# Patient Record
Sex: FEMALE | Race: OTHER | NOT HISPANIC OR LATINO
[De-identification: names, ages, dates, MRNs, and addresses within clinical notes are randomized per-mention and may not be internally consistent; named-entity substitution may affect disease eponyms.]

---
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
## 2020-01-20 ENCOUNTER — APPOINTMENT (OUTPATIENT)
Dept: DERMATOLOGY | Facility: CLINIC | Age: 20
End: 2020-01-20

## 2021-05-13 ENCOUNTER — APPOINTMENT (OUTPATIENT)
Dept: OBGYN | Facility: CLINIC | Age: 21
End: 2021-05-13
Payer: COMMERCIAL

## 2021-05-13 VITALS
SYSTOLIC BLOOD PRESSURE: 110 MMHG | WEIGHT: 119 LBS | BODY MASS INDEX: 21.9 KG/M2 | HEIGHT: 62 IN | DIASTOLIC BLOOD PRESSURE: 69 MMHG

## 2021-05-13 PROCEDURE — 99072 ADDL SUPL MATRL&STAF TM PHE: CPT

## 2021-05-13 PROCEDURE — 99395 PREV VISIT EST AGE 18-39: CPT

## 2021-05-13 NOTE — PLAN
[FreeTextEntry1] : 20 year old female wwe\par \par 1. Pelvic exam deferred - not sexually active\par 2. SBE reviewed\par 3. Counseled on irregular menses, heavy menses and dysmenorrhea.  Differential diagnosis discussed with the patient.  Will get day 3 labs and RTO for tv sono and to review blood work.

## 2021-05-13 NOTE — HISTORY OF PRESENT ILLNESS
[FreeTextEntry1] : 20 year old female presents for wwe.  She has no complaints today.  She states she recently saw her PCP and her cholesterol was elevated.Her PCP told her that she should see her gyn for a PCOS work up.  Menarche was at age 10.  Menses are q 28-30 days, but she does have a history of irregular cycles.  She has never skipped a cycle.  She does have heavy menses and cramping.  She has a history of acne but denies hirsutism.  She thought it the past that she could have endometriosis.  She has never been sexually active.  She was on Mayra but stopped 6 weeks ago as it was too expensive.  She is unsure if she wants to go back on OCP's.  She has been on femcon, OTC, mircette and deporpovera in the past.  She has a medical history of anxiety, depression, and a blood transfusion.  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  She takes spironolactone, celexa and trazadone as needed.

## 2021-06-23 ENCOUNTER — APPOINTMENT (OUTPATIENT)
Dept: OBGYN | Facility: CLINIC | Age: 21
End: 2021-06-23
Payer: COMMERCIAL

## 2021-06-23 ENCOUNTER — ASOB RESULT (OUTPATIENT)
Age: 21
End: 2021-06-23

## 2021-06-23 VITALS — WEIGHT: 119 LBS | BODY MASS INDEX: 21.9 KG/M2 | HEIGHT: 62 IN

## 2021-06-23 PROCEDURE — 99213 OFFICE O/P EST LOW 20 MIN: CPT

## 2021-06-23 PROCEDURE — 99072 ADDL SUPL MATRL&STAF TM PHE: CPT

## 2021-06-23 PROCEDURE — 76856 US EXAM PELVIC COMPLETE: CPT

## 2021-09-02 NOTE — PLAN
[FreeTextEntry1] : 20 year old female with history of irregular menses, heavy menses and dysmenorrhea.  She did not go for the day 3 blood work but plans to have it done.  Sono results reviewed with the patient and polyfollicular ovaries noted.  We discussed PCOS and the diagnostic criteria of irregular menses and acne or hirsutism.  she has a history of acne.  We discussed that she may have a mild case of PCOS.  she is not interested in being on OCP's to regulate her cycle as she is not sexually active.  We discussed that to protect the lining of her uterus she should have her cycle at least every 3 months.  She states she will call if she goes more than 3 months without her menses and she understands the importance of follow up.  The patient was given the opportunity to ask questions and all were answered to her satisfaction.  Will call with results of day 3 labs.  RTO in 1 year for wwe.

## 2021-09-02 NOTE — HISTORY OF PRESENT ILLNESS
[FreeTextEntry1] : 20 year old female presents for sono results.  She did not have the day 3 labs done.  She has no complaints today.  She states she recently saw her PCP and her cholesterol was elevated.  Her PCP told her that she should see her gyn for a PCOS work up.  Menarche was at age 10.  Menses are q 28-30 days, but she does have a history of irregular cycles.  She has never skipped a cycle.  She does have heavy menses and cramping.  She has a history of acne but denies hirsutism.  She thought in the past that she could have endometriosis.  She has never been sexually active.  She was on Mayra but stopped 2 months ago as it was too expensive.  She is unsure if she wants to go back on OCP's.  She has been on femcon, OTC, mircette and deporpovera in the past.  She has a medical history of anxiety, depression, and a blood transfusion.  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  She takes spironolactone, celexa and trazadone as needed.

## 2022-05-31 ENCOUNTER — APPOINTMENT (OUTPATIENT)
Dept: OBGYN | Facility: CLINIC | Age: 22
End: 2022-05-31

## 2022-05-31 ENCOUNTER — TRANSCRIPTION ENCOUNTER (OUTPATIENT)
Age: 22
End: 2022-05-31

## 2022-05-31 VITALS
BODY MASS INDEX: 20.98 KG/M2 | HEIGHT: 62 IN | SYSTOLIC BLOOD PRESSURE: 112 MMHG | WEIGHT: 114 LBS | DIASTOLIC BLOOD PRESSURE: 70 MMHG

## 2022-05-31 PROCEDURE — 99395 PREV VISIT EST AGE 18-39: CPT

## 2022-06-01 LAB
C TRACH RRNA SPEC QL NAA+PROBE: NOT DETECTED
N GONORRHOEA RRNA SPEC QL NAA+PROBE: NOT DETECTED
SOURCE TP AMPLIFICATION: NORMAL

## 2022-06-07 LAB — CYTOLOGY CVX/VAG DOC THIN PREP: NORMAL

## 2022-06-22 ENCOUNTER — NON-APPOINTMENT (OUTPATIENT)
Age: 22
End: 2022-06-22

## 2022-08-02 NOTE — HISTORY OF PRESENT ILLNESS
[FreeTextEntry1] : 21 year old female presents for wwe.  She has no complaints today.  Menses are q 22-38 days, but she does have a history of irregular cycles.  She has never skipped a cycle.  She does have heavy menses and cramping.  She has a history of acne but denies hirsutism.  She has a history of PCOS.  She thinks she could have endometriosis.  She has been sexually active in the past but is not currently sexually active. She is unsure if she wants to go back on OCP's.  She has been on elroy, femcon, OTC, mircette and deporpovera in the past.  She is requesting STD testing today.  She has no known exposures.\par \par She has a medical history of anxiety, depression, acne, eczema, PTSD, insomnia, orthostatic hypotension and a blood transfusion.  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  She has no significant family history she takes spironolactone, celexa and trazadone as needed.

## 2022-08-02 NOTE — PLAN
[FreeTextEntry1] : 21 year old female wwe\par \par 1.  Pap done with GC cultures\par 2. SBE reviewed\par 3.  Rx provided for STI blood work.  All risks and benefits of STI blood work reviewed with the patient.  We discussed that she should repeat this blood work in 3 to 6 months.\par 4. Counseled on irregular menses, heavy menses and dysmenorrhea.  She also has a history of acne.  She is currently taking spironolactone for her acne which has been prescribed by her dermatologist.  She also has been diagnosed with PCOS.  She is interested in starting something to help to regulate her cycles.  She has been on Femcon Fe, Ortho Tri-Cyclen, Mircette, Depo-Provera, and Mayra in the past.  She was happiest on BeYaz but states it became too expensive.  Rx provided today for Vestura x3 months.  We discussed that there can be an interaction with the Vestura and spironolactone in terms of her potassium.  She will return to the office in 3 months for an OCP, blood pressure check and a prescription to check her potassium levels.  The patient was given the opportunity to ask questions and all were answered to her satisfaction.\par 5.  Annual exam in 1 year

## 2023-06-13 ENCOUNTER — APPOINTMENT (OUTPATIENT)
Dept: OBGYN | Facility: CLINIC | Age: 23
End: 2023-06-13
Payer: COMMERCIAL

## 2023-06-13 ENCOUNTER — NON-APPOINTMENT (OUTPATIENT)
Age: 23
End: 2023-06-13

## 2023-06-13 VITALS
HEIGHT: 62 IN | DIASTOLIC BLOOD PRESSURE: 68 MMHG | BODY MASS INDEX: 22.26 KG/M2 | SYSTOLIC BLOOD PRESSURE: 104 MMHG | WEIGHT: 121 LBS

## 2023-06-13 DIAGNOSIS — Z01.419 ENCOUNTER FOR GYNECOLOGICAL EXAMINATION (GENERAL) (ROUTINE) W/OUT ABNORMAL FINDINGS: ICD-10-CM

## 2023-06-13 DIAGNOSIS — N94.6 DYSMENORRHEA, UNSPECIFIED: ICD-10-CM

## 2023-06-13 DIAGNOSIS — N92.6 IRREGULAR MENSTRUATION, UNSPECIFIED: ICD-10-CM

## 2023-06-13 PROCEDURE — 99395 PREV VISIT EST AGE 18-39: CPT

## 2023-06-13 PROCEDURE — 99212 OFFICE O/P EST SF 10 MIN: CPT | Mod: 25

## 2023-06-15 RX ORDER — DROSPIRENONE AND ETHINYL ESTRADIOL 0.02-3(28)
3-0.02 KIT ORAL DAILY
Qty: 3 | Refills: 1 | Status: COMPLETED | COMMUNITY
Start: 2018-12-15 | End: 2023-06-15

## 2023-07-05 PROBLEM — Z01.419 WELL WOMAN EXAM WITH ROUTINE GYNECOLOGICAL EXAM: Status: ACTIVE | Noted: 2021-05-13

## 2023-07-05 PROBLEM — N94.6 DYSMENORRHEA: Status: ACTIVE | Noted: 2018-12-15

## 2023-07-05 RX ORDER — DROSPIRENONE AND ETHINYL ESTRADIOL 0.02-3(28)
3-0.02 KIT ORAL
Qty: 3 | Refills: 0 | Status: ACTIVE | COMMUNITY
Start: 2022-06-15 | End: 1900-01-01

## 2023-07-05 NOTE — HISTORY OF PRESENT ILLNESS
[FreeTextEntry1] : 22 year old female presents for wwe.  She has no complaints today.  Menses are q 20-30 days, but she does have a history of irregular cycles.  She has never skipped a cycle.  She denies heavy menses.  She does get significant cramping with her menses and ovulation.  She takes Midol with some relief.  She is currently using condoms for contraception.  She has a history of acne but denies hirsutism.  She has a history of PCOS.  She thinks she could have endometriosis.  She has been sexually active in the past but is not currently sexually active. She is interested in going back on OCP's.  She has been on elroy, femcon, OTC, mircette and deporpovera in the past.  \par \par She has a medical history of anxiety, depression, acne, eczema, PTSD, insomnia, orthostatic hypotension and a blood transfusion.  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  She has no significant family history she takes spironolactone, celexa, and vitamins.

## 2023-07-05 NOTE — PLAN
[FreeTextEntry1] : 22 year old female wwe\par \par 1. Pap done with GC cultures\par 2. SBE reviewed\par 3. Counseled on irregular menses and dysmenorrhea.  She also has a history of acne.  She is currently taking spironolactone for her acne which has been prescribed by her dermatologist.  She also has been diagnosed with PCOS.  She is interested in starting something to help to regulate her cycles.  She has been on Femcon Fe, Ortho Tri-Cyclen, Mircette, Depo-Provera, and Mayra in the past.  She was happiest on BeYaz but states it became too expensive.  Rx provided today for Thom x3 months.  We discussed that there can be an interaction with the Vestura and spironolactone in terms of her potassium.  She will return to the office in 3 months for an OCP check.  She does get her potassium level checked by her dermatologist every 3 months.  The patient was given the opportunity to ask questions and all were answered to her satisfaction.\par 4.  Annual exam in 1 year

## 2023-07-06 ENCOUNTER — NON-APPOINTMENT (OUTPATIENT)
Age: 23
End: 2023-07-06

## 2023-08-14 ENCOUNTER — TRANSCRIPTION ENCOUNTER (OUTPATIENT)
Age: 23
End: 2023-08-14

## 2024-09-18 ENCOUNTER — APPOINTMENT (OUTPATIENT)
Dept: OBGYN | Facility: CLINIC | Age: 24
End: 2024-09-18
Payer: COMMERCIAL

## 2024-09-18 VITALS
BODY MASS INDEX: 25.03 KG/M2 | HEIGHT: 62 IN | DIASTOLIC BLOOD PRESSURE: 71 MMHG | SYSTOLIC BLOOD PRESSURE: 108 MMHG | WEIGHT: 136 LBS

## 2024-09-18 DIAGNOSIS — Z01.419 ENCOUNTER FOR GYNECOLOGICAL EXAMINATION (GENERAL) (ROUTINE) W/OUT ABNORMAL FINDINGS: ICD-10-CM

## 2024-09-18 PROCEDURE — 99459 PELVIC EXAMINATION: CPT

## 2024-09-18 PROCEDURE — 99395 PREV VISIT EST AGE 18-39: CPT

## 2024-09-18 NOTE — HISTORY OF PRESENT ILLNESS
[FreeTextEntry1] : 23 year old female presents for wwe.  She has no complaints today.  Menses are q 20-30 days, but she does have a history of irregular cycles.  She denies heavy menses.  She does get significant cramping with her menses and ovulation.  She takes Midol with some relief.  She has a history of acne but denies hirsutism.  She has a history of PCOS.  She thinks she could have endometriosis.  She is using condoms for contraception.  She has been on elryo, femcon, OTC, mircette and deporpovera in the past.  She was given a prescription for Elroy last year but stopped secondary to nausea.  Patient noticed a skin tag on her left vulva.  She states that this is bothersome and would like to have it removed.  She is interested in having this looked at today.     She has a medical history of anxiety, depression, acne, eczema, PTSD, insomnia, orthostatic hypotension and a blood transfusion.  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  She has no significant family history she takes spironolactone, celexa, aripiprazole, and Valtrex.

## 2024-09-18 NOTE — PHYSICAL EXAM
[Chaperone Present] : A chaperone was present in the examining room during all aspects of the physical examination [[id number]] : A chaperone was present during the pelvic exam. [FreeTextEntry2] : Mikki Carranza [Appropriately responsive] : appropriately responsive [Alert] : alert [No Acute Distress] : no acute distress [No Lymphadenopathy] : no lymphadenopathy [Regular Rate Rhythm] : regular rate rhythm [No Murmurs] : no murmurs [Clear to Auscultation B/L] : clear to auscultation bilaterally [Soft] : soft [Non-tender] : non-tender [Non-distended] : non-distended [No HSM] : No HSM [No Lesions] : no lesions [No Mass] : no mass [Oriented x3] : oriented x3 [Examination Of The Breasts] : a normal appearance [No Masses] : no breast masses were palpable [Labia Majora] : normal [Labia Minora] : normal [Normal] : normal [Uterine Adnexae] : normal [FreeTextEntry1] : 4 mm left vulvar skin tag

## 2024-09-18 NOTE — PLAN
[FreeTextEntry1] : 23 year old female wwe  1. Pap done with GC cultures 2. SBE reviewed 3. Condoms for contraception 4.  Vulvar skin tag.  The patient desires removal as it is bothersome.  She will return to the office for removal of her vulvar skin tag.  All risk, benefits and potential complications reviewed with the patient. 5.  Annual exam in 1 year

## 2024-09-26 LAB — CYTOLOGY CVX/VAG DOC THIN PREP: ABNORMAL

## 2024-09-27 ENCOUNTER — LABORATORY RESULT (OUTPATIENT)
Age: 24
End: 2024-09-27

## 2024-09-30 LAB — HPV HIGH+LOW RISK DNA PNL CVX: DETECTED

## 2025-01-23 ENCOUNTER — APPOINTMENT (OUTPATIENT)
Dept: OBGYN | Facility: CLINIC | Age: 25
End: 2025-01-23
Payer: COMMERCIAL

## 2025-01-23 VITALS
WEIGHT: 136 LBS | HEIGHT: 62 IN | BODY MASS INDEX: 25.03 KG/M2 | DIASTOLIC BLOOD PRESSURE: 58 MMHG | SYSTOLIC BLOOD PRESSURE: 94 MMHG

## 2025-01-23 DIAGNOSIS — N90.89 OTHER SPECIFIED NONINFLAMMATORY DISORDERS OF VULVA AND PERINEUM: ICD-10-CM

## 2025-01-23 PROCEDURE — 56605 BIOPSY OF VULVA/PERINEUM: CPT

## 2025-01-28 LAB — CORE LAB BIOPSY: NORMAL
